# Patient Record
Sex: MALE | Race: WHITE | NOT HISPANIC OR LATINO | URBAN - METROPOLITAN AREA
[De-identification: names, ages, dates, MRNs, and addresses within clinical notes are randomized per-mention and may not be internally consistent; named-entity substitution may affect disease eponyms.]

---
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
## 2025-01-03 ENCOUNTER — APPOINTMENT (OUTPATIENT)
Dept: PEDIATRICS | Facility: CLINIC | Age: 20
End: 2025-01-03
Payer: COMMERCIAL

## 2025-01-03 VITALS
HEIGHT: 68 IN | HEART RATE: 87 BPM | DIASTOLIC BLOOD PRESSURE: 82 MMHG | WEIGHT: 147.6 LBS | SYSTOLIC BLOOD PRESSURE: 118 MMHG | BODY MASS INDEX: 22.37 KG/M2 | OXYGEN SATURATION: 100 % | TEMPERATURE: 97.2 F

## 2025-01-03 DIAGNOSIS — Z00.01 ENCOUNTER FOR GENERAL ADULT MEDICAL EXAMINATION WITH ABNORMAL FINDINGS: Primary | ICD-10-CM

## 2025-01-03 DIAGNOSIS — F90.0 ATTENTION DEFICIT HYPERACTIVITY DISORDER (ADHD), PREDOMINANTLY INATTENTIVE TYPE: ICD-10-CM

## 2025-01-03 DIAGNOSIS — Z72.51 SEXUALLY ACTIVE CHILD: ICD-10-CM

## 2025-01-03 DIAGNOSIS — L02.416 ABSCESS OF LEFT LOWER EXTREMITY: ICD-10-CM

## 2025-01-03 DIAGNOSIS — D22.9 MELANOCYTIC NEVUS, UNSPECIFIED LOCATION: ICD-10-CM

## 2025-01-03 DIAGNOSIS — E73.9 LACTOSE INTOLERANCE: ICD-10-CM

## 2025-01-03 PROCEDURE — 96127 BRIEF EMOTIONAL/BEHAV ASSMT: CPT | Performed by: FAMILY MEDICINE

## 2025-01-03 PROCEDURE — 80359 METHYLENEDIOXYAMPHETAMINES: CPT

## 2025-01-03 PROCEDURE — 99213 OFFICE O/P EST LOW 20 MIN: CPT | Performed by: FAMILY MEDICINE

## 2025-01-03 PROCEDURE — 3008F BODY MASS INDEX DOCD: CPT | Performed by: FAMILY MEDICINE

## 2025-01-03 PROCEDURE — 99395 PREV VISIT EST AGE 18-39: CPT | Performed by: FAMILY MEDICINE

## 2025-01-03 RX ORDER — DEXTROAMPHETAMINE SACCHARATE, AMPHETAMINE ASPARTATE MONOHYDRATE, DEXTROAMPHETAMINE SULFATE AND AMPHETAMINE SULFATE 3.75; 3.75; 3.75; 3.75 MG/1; MG/1; MG/1; MG/1
15 CAPSULE, EXTENDED RELEASE ORAL EVERY MORNING
Qty: 30 CAPSULE | Refills: 0 | Status: SHIPPED | OUTPATIENT
Start: 2025-02-12 | End: 2025-03-14

## 2025-01-03 RX ORDER — DEXTROAMPHETAMINE SACCHARATE, AMPHETAMINE ASPARTATE MONOHYDRATE, DEXTROAMPHETAMINE SULFATE AND AMPHETAMINE SULFATE 3.75; 3.75; 3.75; 3.75 MG/1; MG/1; MG/1; MG/1
15 CAPSULE, EXTENDED RELEASE ORAL EVERY MORNING
Qty: 30 CAPSULE | Refills: 0 | Status: SHIPPED | OUTPATIENT
Start: 2025-03-12 | End: 2025-04-11

## 2025-01-03 RX ORDER — SULFAMETHOXAZOLE AND TRIMETHOPRIM 400; 80 MG/1; MG/1
2 TABLET ORAL 2 TIMES DAILY
Qty: 40 TABLET | Refills: 0 | Status: SHIPPED | OUTPATIENT
Start: 2025-01-03 | End: 2025-01-13

## 2025-01-03 RX ORDER — DEXTROAMPHETAMINE SACCHARATE, AMPHETAMINE ASPARTATE MONOHYDRATE, DEXTROAMPHETAMINE SULFATE AND AMPHETAMINE SULFATE 3.75; 3.75; 3.75; 3.75 MG/1; MG/1; MG/1; MG/1
15 CAPSULE, EXTENDED RELEASE ORAL EVERY MORNING
Qty: 30 CAPSULE | Refills: 0 | Status: SHIPPED | OUTPATIENT
Start: 2025-01-13 | End: 2025-02-12

## 2025-01-03 ASSESSMENT — PATIENT HEALTH QUESTIONNAIRE - PHQ9
4. FEELING TIRED OR HAVING LITTLE ENERGY: NOT AT ALL
6. FEELING BAD ABOUT YOURSELF - OR THAT YOU ARE A FAILURE OR HAVE LET YOURSELF OR YOUR FAMILY DOWN: NOT AT ALL
5. POOR APPETITE OR OVEREATING: NOT AT ALL
5. POOR APPETITE OR OVEREATING: NOT AT ALL
8. MOVING OR SPEAKING SO SLOWLY THAT OTHER PEOPLE COULD HAVE NOTICED. OR THE OPPOSITE - BEING SO FIDGETY OR RESTLESS THAT YOU HAVE BEEN MOVING AROUND A LOT MORE THAN USUAL: NOT AT ALL
SUM OF ALL RESPONSES TO PHQ QUESTIONS 1-9: 0
6. FEELING BAD ABOUT YOURSELF - OR THAT YOU ARE A FAILURE OR HAVE LET YOURSELF OR YOUR FAMILY DOWN: NOT AT ALL
2. FEELING DOWN, DEPRESSED OR HOPELESS: NOT AT ALL
10. IF YOU CHECKED OFF ANY PROBLEMS, HOW DIFFICULT HAVE THESE PROBLEMS MADE IT FOR YOU TO DO YOUR WORK, TAKE CARE OF THINGS AT HOME, OR GET ALONG WITH OTHER PEOPLE: NOT DIFFICULT AT ALL
2. FEELING DOWN, DEPRESSED OR HOPELESS: NOT AT ALL
3. TROUBLE FALLING OR STAYING ASLEEP OR SLEEPING TOO MUCH: NOT AT ALL
1. LITTLE INTEREST OR PLEASURE IN DOING THINGS: NOT AT ALL
1. LITTLE INTEREST OR PLEASURE IN DOING THINGS: NOT AT ALL
3. TROUBLE FALLING OR STAYING ASLEEP: NOT AT ALL
9. THOUGHTS THAT YOU WOULD BE BETTER OFF DEAD, OR OF HURTING YOURSELF: NOT AT ALL
7. TROUBLE CONCENTRATING ON THINGS, SUCH AS READING THE NEWSPAPER OR WATCHING TELEVISION: NOT AT ALL
8. MOVING OR SPEAKING SO SLOWLY THAT OTHER PEOPLE COULD HAVE NOTICED. OR THE OPPOSITE, BEING SO FIGETY OR RESTLESS THAT YOU HAVE BEEN MOVING AROUND A LOT MORE THAN USUAL: NOT AT ALL
7. TROUBLE CONCENTRATING ON THINGS, SUCH AS READING THE NEWSPAPER OR WATCHING TELEVISION: NOT AT ALL
10. IF YOU CHECKED OFF ANY PROBLEMS, HOW DIFFICULT HAVE THESE PROBLEMS MADE IT FOR YOU TO DO YOUR WORK, TAKE CARE OF THINGS AT HOME, OR GET ALONG WITH OTHER PEOPLE: NOT DIFFICULT AT ALL
SUM OF ALL RESPONSES TO PHQ9 QUESTIONS 1 & 2: 0
4. FEELING TIRED OR HAVING LITTLE ENERGY: NOT AT ALL
9. THOUGHTS THAT YOU WOULD BE BETTER OFF DEAD, OR OF HURTING YOURSELF: NOT AT ALL

## 2025-01-03 NOTE — PATIENT INSTRUCTIONS
Healthy 19 y.o. male child.  Problem List Items Addressed This Visit          Gastrointestinal and Abdominal    Lactose intolerance     Lactose Intolerance.  Avoidance of foods containing lactose (milk, cheese, yogurt, ice cream, etc.), replacement with low lactose/lactaid products, or replacement of lactase enzyme when eating lactose containing foods (Lactaid pills, drops or similar).              Hematology and Neoplasia    Melanocytic nevus     Numerous birthmarks particularly on back - + Irregular borders, large size and some with irregular coloration.  Annual Dermatology visit planned in 5 days.              Mental Health    Attention deficit hyperactivity disorder (ADHD), predominantly inattentive type     ADHD - Good control. Continue with Adderall XR 15 mg each morning. Recheck in 6 months.  Recommend taking medication daily.   Please call if any thoughts of self-harm or suicide.   Please call if problems.           Relevant Medications    amphetamine-dextroamphetamine XR (Adderall XR) 15 mg 24 hr capsule (Start on 1/13/2025)    amphetamine-dextroamphetamine XR (Adderall XR) 15 mg 24 hr capsule (Start on 2/12/2025)    amphetamine-dextroamphetamine XR (Adderall XR) 15 mg 24 hr capsule (Start on 3/12/2025)    Other Relevant Orders    Drug Screen, Urine With Reflex to Confirmation     Other Visit Diagnoses       Encounter for general adult medical examination with abnormal findings    -  Primary    Abscess of left lower extremity        Relevant Medications    sulfamethoxazole-trimethoprim (Bactrim) 400-80 mg tablet    Sexually active child        Relevant Orders    HIV 1/2 Antigen/Antibody Screen with Reflex to Confirmation    Syphilis Screen with Reflex    C. trachomatis / N. gonorrhoeae, Amplified        Left upper thigh abscess. Non-fluctuant.  Bactrim - 2 tablets twice daily for 10 days.  Symptomatic treatment.  Please call if symptoms worsen or fail to improve in the next 3 to 5 days.    Shots up to date.  Declined Covid-19 vaccine.  Received Flu shot 10/15/2024 approx.      1. Anticipatory guidance discussed.  Gave handout on well-child issues at this age.  2.   Orders Placed This Encounter   Procedures    HIV 1/2 Antigen/Antibody Screen with Reflex to Confirmation    Syphilis Screen with Reflex    C. trachomatis / N. gonorrhoeae, Amplified    Drug Screen, Urine With Reflex to Confirmation     3. Follow-up visit in 1 year for next well child visit, or sooner as needed.

## 2025-01-03 NOTE — ASSESSMENT & PLAN NOTE
Numerous birthmarks particularly on back - + Irregular borders, large size and some with irregular coloration.  Annual Dermatology visit planned in 5 days.

## 2025-01-03 NOTE — PROGRESS NOTES
"Subjective   Jose is a 19 y.o. male who presents today with his mother for his Health Maintenance and Supervision Exam.    Flu shot 10/15/2024 - not at our office.      Sweating a lot.   Dermatologist visit in 5 days - Nevi check.  Tried Qbrexa and would make hands really dried.  Tried Dry-Sol - Did not work.      Lactose intolerance - If no dairy for a while and comes back, a little bit of an issue.      ADHD -   Doing well \"No problems\" - Some fidgeting - 1 score in often on the [place] scale but no scores in very often.   I have personally reviewed the OARRS report.  This report is electronically entered into the electronic medical record. I have considered the risks of abuse, dependence, addiction and diversion.  Completed Urine drug screening today.    Did not take Adderall as much over break - Restarted recently.    School - At [employer]      Recent wisdom teeth removal.      General Health:  Jose is overall in good health.  Concerns today: No    Social and Family History:  At home, there have been no interval changes.  Parental support, work/family balance? Yes    Nutrition:  Balanced diet? Yes  Current Diet: vegetables, fruits, meats, cereals/grains, dairy  Calcium source? Yes  Concerns about body image? No  Uses nutritional supplements? No    Dental Care:  Jose has a dental home? Yes  Dental hygiene regularly performed? Yes  Fluoridate water: Yes    Elimination:  Elimination patterns appropriate: Yes    Sleep:  Sleep patterns appropriate? Yes  Sleep problems: No     Behavior/Socialization:  Good relationships with parents and siblings? Yes  Supportive adult relationship? Yes  Permitted to make decisions? Yes  Responsibilities and chores? Yes  Family Meals? Yes  Normal peer relationships? Yes    Development/Education:  [employer]     Activities:  Physical Activity: Yes    Sexual History:  Dating? Yes  Sexually Active? Yes--Uses Contraception: " consistently uses barrier protection    Drugs:  Tobacco? No  Uses drugs? alcohol, drinking at college.    Mental Health:  Depression Screening: not at risk  Thoughts of self harm/suicide? No  Travel Screening    1/3/2025 10:10 AM EST - Filed by Patient   Do you have any of the following new or worsening symptoms? None of these   Have you recently been in contact with someone who was sick? No / Unsure     Patient Health Questionnaire-Depression Screening (Phq-9)    1/3/2025 10:12 AM EST - Filed by Patient   Over the last 2 weeks, how often have you been bothered by any of the following problems?    Little interest or pleasure in doing things Not at all   Feeling down, depressed, or hopeless Not at all   Trouble falling or staying asleep, or sleeping too much Not at all   Feeling tired or having little energy Not at all   Poor appetite or overeating Not at all   Feeling bad about yourself - or that you are a failure or have let yourself or your family down Not at all   Trouble concentrating on things, such as reading the newspaper or watching television Not at all   Moving or speaking so slowly that other people could have noticed? Or the opposite - being so fidgety or restless that you have been moving around a lot more than usual. Not at all   Thoughts that you would be better off dead or hurting yourself in some way Not at all   If you checked off any problems on this questionnaire, how difficult have these problems made it for you to do your work, take care of things at home, or get along with other people? Not difficult at all     [facility] Suicide-Screening Questions    1/3/2025 10:12 AM EST - Filed by Patient   In the past few weeks, have you wished you were dead? No   In the past few weeks, have you felt that you or your family would be better off if you were dead? No   In the past week, have you been having thoughts about killing yourself? No   Have you ever tried to kill yourself? No     Patient Health  Questionnaire-9 Score: (Patient-Rptd) 0 (1/3/2025 10:12 AM)  Calculated Risk Score: (Patient-Rptd) No intervention is necessary (1/3/2025 10:12 AM)        Risk Assessment:  Additional health risks: No    Safety Assessment:  Safety topics reviewed: Yes    Objective   Physical Exam  Constitutional:       Appearance: Normal appearance.   HENT:      Head: Normocephalic.      Right Ear: Tympanic membrane normal.      Left Ear: Tympanic membrane normal.      Nose: Nose normal.      Mouth/Throat:      Mouth: Mucous membranes are moist.   Eyes:      Conjunctiva/sclera: Conjunctivae normal.   Cardiovascular:      Rate and Rhythm: Normal rate and regular rhythm.   Pulmonary:      Effort: Pulmonary effort is normal.      Breath sounds: Normal breath sounds.   Abdominal:      Palpations: Abdomen is soft.   Genitourinary:     Penis: Normal.       Testes: Normal.      Ad stage (genital): 5.   Musculoskeletal:      Cervical back: Normal range of motion and neck supple.   Skin:     General: Skin is warm and dry.      Comments: Numerous irregular nevi - Coloration, border, large size on back mainly.     Left upper anterior thigh with approx 1 cm mildly tender, erythematous swelling.  Non-fluctuant abscess.     Neurological:      General: No focal deficit present.      Mental Status: He is alert.   Psychiatric:         Mood and Affect: Mood normal.         Assessment/Plan   Healthy 19 y.o. male child.  Problem List Items Addressed This Visit          Gastrointestinal and Abdominal    Lactose intolerance     Lactose Intolerance.  Avoidance of foods containing lactose (milk, cheese, yogurt, ice cream, etc.), replacement with low lactose/lactaid products, or replacement of lactase enzyme when eating lactose containing foods (Lactaid pills, drops or similar).              Hematology and Neoplasia    Melanocytic nevus     Numerous birthmarks particularly on back - + Irregular borders, large size and some with irregular coloration.   Annual Dermatology visit planned in 5 days.              Mental Health    Attention deficit hyperactivity disorder (ADHD), predominantly inattentive type     ADHD - Good control. Continue with Adderall XR 15 mg each morning. Recheck in 6 months.  Recommend taking medication daily.   Please call if any thoughts of self-harm or suicide.   Please call if problems.           Relevant Medications    amphetamine-dextroamphetamine XR (Adderall XR) 15 mg 24 hr capsule (Start on 1/13/2025)    amphetamine-dextroamphetamine XR (Adderall XR) 15 mg 24 hr capsule (Start on 2/12/2025)    amphetamine-dextroamphetamine XR (Adderall XR) 15 mg 24 hr capsule (Start on 3/12/2025)    Other Relevant Orders    Drug Screen, Urine With Reflex to Confirmation     Other Visit Diagnoses       Encounter for general adult medical examination with abnormal findings    -  Primary    Abscess of left lower extremity        Relevant Medications    sulfamethoxazole-trimethoprim (Bactrim) 400-80 mg tablet    Sexually active child        Relevant Orders    HIV 1/2 Antigen/Antibody Screen with Reflex to Confirmation    Syphilis Screen with Reflex    C. trachomatis / N. gonorrhoeae, Amplified        Left upper thigh abscess. Non-fluctuant.  Bactrim - 2 tablets twice daily for 10 days.  Symptomatic treatment.  Please call if symptoms worsen or fail to improve in the next 3 to 5 days.    Shots up to date. Declined Covid-19 vaccine.  Received Flu shot 10/15/2024 approx.      1. Anticipatory guidance discussed.  Gave handout on well-child issues at this age.  2.   Orders Placed This Encounter   Procedures    HIV 1/2 Antigen/Antibody Screen with Reflex to Confirmation    Syphilis Screen with Reflex    C. trachomatis / N. gonorrhoeae, Amplified    Drug Screen, Urine With Reflex to Confirmation     3. Follow-up visit in 1 year for next well child visit, or sooner as needed.

## 2025-01-03 NOTE — ASSESSMENT & PLAN NOTE
ADHD - Good control. Continue with Adderall XR 15 mg each morning. Recheck in 6 months.  Recommend taking medication daily.   Please call if any thoughts of self-harm or suicide.   Please call if problems.

## 2025-01-04 LAB
AMPHETAMINES UR QL SCN: ABNORMAL
BARBITURATES UR QL SCN: ABNORMAL
BENZODIAZ UR QL SCN: ABNORMAL
BZE UR QL SCN: ABNORMAL
C TRACH RRNA SPEC QL NAA+PROBE: NEGATIVE
CANNABINOIDS UR QL SCN: ABNORMAL
FENTANYL+NORFENTANYL UR QL SCN: ABNORMAL
METHADONE UR QL SCN: ABNORMAL
N GONORRHOEA DNA SPEC QL PROBE+SIG AMP: NEGATIVE
OPIATES UR QL SCN: ABNORMAL
OXYCODONE+OXYMORPHONE UR QL SCN: ABNORMAL
PCP UR QL SCN: ABNORMAL

## 2025-01-09 LAB
AMPHET UR-MCNC: 4032 NG/ML
MDA UR-MCNC: <200 NG/ML
MDEA UR-MCNC: <200 NG/ML
MDMA UR-MCNC: <200 NG/ML
METHAMPHET UR-MCNC: <200 NG/ML
PHENTERMINE UR CFM-MCNC: <200 NG/ML

## 2025-01-10 LAB — CARBOXYTHC UR-MCNC: 138 NG/ML

## 2025-06-30 ENCOUNTER — APPOINTMENT (OUTPATIENT)
Dept: PEDIATRICS | Facility: CLINIC | Age: 20
End: 2025-06-30
Payer: COMMERCIAL

## 2025-06-30 VITALS
OXYGEN SATURATION: 100 % | WEIGHT: 156.8 LBS | RESPIRATION RATE: 20 BRPM | TEMPERATURE: 97.5 F | SYSTOLIC BLOOD PRESSURE: 124 MMHG | DIASTOLIC BLOOD PRESSURE: 76 MMHG | HEART RATE: 69 BPM | BODY MASS INDEX: 23.76 KG/M2 | HEIGHT: 68 IN

## 2025-06-30 DIAGNOSIS — R03.0 ELEVATED BP WITHOUT DIAGNOSIS OF HYPERTENSION: ICD-10-CM

## 2025-06-30 DIAGNOSIS — F90.0 ATTENTION DEFICIT HYPERACTIVITY DISORDER (ADHD), PREDOMINANTLY INATTENTIVE TYPE: Primary | ICD-10-CM

## 2025-06-30 PROCEDURE — 3008F BODY MASS INDEX DOCD: CPT | Performed by: FAMILY MEDICINE

## 2025-06-30 PROCEDURE — 99213 OFFICE O/P EST LOW 20 MIN: CPT | Performed by: FAMILY MEDICINE

## 2025-06-30 PROCEDURE — 96127 BRIEF EMOTIONAL/BEHAV ASSMT: CPT | Performed by: FAMILY MEDICINE

## 2025-06-30 RX ORDER — DEXTROAMPHETAMINE SACCHARATE, AMPHETAMINE ASPARTATE MONOHYDRATE, DEXTROAMPHETAMINE SULFATE AND AMPHETAMINE SULFATE 3.75; 3.75; 3.75; 3.75 MG/1; MG/1; MG/1; MG/1
15 CAPSULE, EXTENDED RELEASE ORAL EVERY MORNING
Qty: 30 CAPSULE | Refills: 0 | Status: SHIPPED | OUTPATIENT
Start: 2025-08-29 | End: 2025-09-28

## 2025-06-30 RX ORDER — DEXTROAMPHETAMINE SACCHARATE, AMPHETAMINE ASPARTATE MONOHYDRATE, DEXTROAMPHETAMINE SULFATE AND AMPHETAMINE SULFATE 3.75; 3.75; 3.75; 3.75 MG/1; MG/1; MG/1; MG/1
15 CAPSULE, EXTENDED RELEASE ORAL EVERY MORNING
Qty: 30 CAPSULE | Refills: 0 | Status: SHIPPED | OUTPATIENT
Start: 2025-07-30 | End: 2025-08-29

## 2025-06-30 RX ORDER — DEXTROAMPHETAMINE SACCHARATE, AMPHETAMINE ASPARTATE MONOHYDRATE, DEXTROAMPHETAMINE SULFATE AND AMPHETAMINE SULFATE 3.75; 3.75; 3.75; 3.75 MG/1; MG/1; MG/1; MG/1
15 CAPSULE, EXTENDED RELEASE ORAL EVERY MORNING
Qty: 30 CAPSULE | Refills: 0 | Status: SHIPPED | OUTPATIENT
Start: 2025-06-30 | End: 2025-07-30

## 2025-06-30 NOTE — PROGRESS NOTES
"Subjective   Patient ID: Jose Siddiqi is a 20 y.o. male who presents for med check.  Today he is accompanied by alone.     HPI    History of Present Illness  The patient presents for ADHD.    ADHD  - He is currently on a regimen of Adderall 15, which he takes once daily in the morning.  - He reports that the medication is effective and does not cause any adverse effects.  - His appetite and sleep patterns are normal, although he occasionally forgets to eat due to a lack of hunger.  - He abstains from taking Adderall during the weekends.  - He reports no instances of chest pain or headaches.  - He is satisfied with his current symptom management.    Supplemental information: He is employed as a  in an office setting in [place].          Objective   /76   Pulse 69   Temp 36.4 °C (97.5 °F)   Resp 20   Ht 1.73 m (5' 8.11\")   Wt 71.1 kg (156 lb 12.8 oz)   SpO2 100%   BMI 23.76 kg/m²   BSA: 1.85 meters squared  Growth percentiles: Facility age limit for growth %joseluis is 20 years. Facility age limit for growth %joseluis is 20 years.     Physical Exam  Constitutional:       Appearance: Normal appearance.   Cardiovascular:      Rate and Rhythm: Normal rate and regular rhythm.   Pulmonary:      Effort: Pulmonary effort is normal.      Breath sounds: Normal breath sounds.   Neurological:      Mental Status: He is alert.     Assessment & Plan  Attention Deficit Hyperactivity Disorder (ADHD).  His [place] scale scores indicate no frequent or very frequent occurrences of symptoms, suggesting effective control of his condition. He reports no significant side effects from the medication, although he occasionally forgets to eat. He does not take the medication on weekends. A prescription for Adderall 15 mg will be sent to the pharmacy for a duration of 3 months. He is advised to continue the current dosage and monitor his eating habits to ensure proper nutrition.    Elevated blood pressure.  His " blood pressure readings have shown a slight increase, with today's reading at 131/76 and 132/75 upon recheck. Previous readings were 126/68 in 08/2024 and 118/82. He is advised to monitor his blood pressure at home if possible. A recheck of his blood pressure  after 5-10 minutes of rest today - 124/76.   Please record blood pressures at home and recheck in 1 month.     Transition to adult care.  He will be turning 21 soon, and it is recommended to transition to an adult doctor. Options include continuing with his current provider until he moves or transitioning to a family medicine group with a nurse practitioner who is accepting new patients. This will be discussed further with his mother.        This medical note was created with the assistance of artificial intelligence (AI) for documentation purposes. The content has been reviewed and confirmed by the healthcare provider for accuracy and completeness. Patient consented to the use of audio recording and use of AI during their visit.

## 2025-06-30 NOTE — PATIENT INSTRUCTIONS
Attention Deficit Hyperactivity Disorder (ADHD).  His [place] scale scores indicate no frequent or very frequent occurrences of symptoms, suggesting effective control of his condition. He reports no significant side effects from the medication, although he occasionally forgets to eat. He does not take the medication on weekends. A prescription for Adderall 15 mg will be sent to the pharmacy for a duration of 3 months. He is advised to continue the current dosage and monitor his eating habits to ensure proper nutrition.    Elevated blood pressure.  His blood pressure readings have shown a slight increase, with today's reading at 131/76 and 132/75 upon recheck. Previous readings were 126/68 in 08/2024 and 118/82. He is advised to monitor his blood pressure at home if possible. A recheck of his blood pressure  after 5-10 minutes of rest today - 124/76.   Please record blood pressures at home and recheck in 1 month.     Transition to adult care.  He will be turning 21 soon, and it is recommended to transition to an adult doctor. Options include continuing with his current provider until he moves or transitioning to a family medicine group with a nurse practitioner who is accepting new patients. This will be discussed further with his mother.

## 2025-07-30 ENCOUNTER — APPOINTMENT (OUTPATIENT)
Dept: PEDIATRICS | Facility: CLINIC | Age: 20
End: 2025-07-30
Payer: COMMERCIAL

## 2026-01-09 ENCOUNTER — APPOINTMENT (OUTPATIENT)
Dept: PEDIATRICS | Facility: CLINIC | Age: 21
End: 2026-01-09
Payer: COMMERCIAL